# Patient Record
Sex: FEMALE | Race: WHITE | NOT HISPANIC OR LATINO | URBAN - METROPOLITAN AREA
[De-identification: names, ages, dates, MRNs, and addresses within clinical notes are randomized per-mention and may not be internally consistent; named-entity substitution may affect disease eponyms.]

---

## 2024-07-09 ENCOUNTER — OFFICE VISIT (OUTPATIENT)
Dept: URGENT CARE | Facility: CLINIC | Age: 49
End: 2024-07-09
Payer: COMMERCIAL

## 2024-07-09 VITALS
OXYGEN SATURATION: 97 % | DIASTOLIC BLOOD PRESSURE: 70 MMHG | SYSTOLIC BLOOD PRESSURE: 108 MMHG | HEART RATE: 61 BPM | TEMPERATURE: 97.6 F | RESPIRATION RATE: 18 BRPM

## 2024-07-09 DIAGNOSIS — R21 RASH: Primary | ICD-10-CM

## 2024-07-09 PROCEDURE — S9083 URGENT CARE CENTER GLOBAL: HCPCS | Performed by: EMERGENCY MEDICINE

## 2024-07-09 PROCEDURE — G0382 LEV 3 HOSP TYPE B ED VISIT: HCPCS | Performed by: EMERGENCY MEDICINE

## 2024-07-09 RX ORDER — LISINOPRIL 10 MG/1
TABLET ORAL
COMMUNITY
Start: 2024-07-04

## 2024-07-09 RX ORDER — FAMOTIDINE 20 MG/1
20 TABLET, FILM COATED ORAL
COMMUNITY
Start: 2024-05-30

## 2024-07-09 RX ORDER — METHYLPREDNISOLONE 4 MG/1
TABLET ORAL
Qty: 21 TABLET | Refills: 0 | Status: SHIPPED | OUTPATIENT
Start: 2024-07-09

## 2024-07-09 NOTE — PATIENT INSTRUCTIONS
Take the Medrol dose pack as directed  Take Benadryl 50mg at bedtime for itching/rash - Do NOT drive with this  F/u with PCP in 2-3 days  4.  Proceed to the ER if symptoms get worse  5. F/u with a dermatologist if no improvement in 2-4 weeks  6.  Avoid Augmentin or any related drugs for now

## 2024-07-09 NOTE — PROGRESS NOTES
[facility]        NAME: Kae Lewis is a 48 y.o. female  : 1975    MRN: [id number]  DATE: 2024  TIME: 4:10 PM    Assessment and Plan   Rash [R21]  1. Rash  methylPREDNISolone (Medrol) 4 MG tablet therapy pack    Probably 2nd to recent Augmentin            Patient Instructions     Patient Instructions    Take the Medrol dose pack as directed  Take Benadryl 50mg at bedtime for itching/rash - Do NOT drive with this  F/u with PCP in 2-3 days  4.  Proceed to the ER if symptoms get worse  5. F/u with a dermatologist if no improvement in 2-4 weeks  6.  Avoid Augmentin or any related drugs for now      Follow up with PCP in 3-5 days.  Proceed to  ER if symptoms worsen.    Chief Complaint     Chief Complaint   Patient presents with   • Rash     Pt c/o skin rash that is on right side of breast chest into neck that noticed this am. Its itchy. Possible bug bite bakari         History of Present Illness       47 yo female with cc rash that started under her right breast and spread to her chest and now the right side of her neck.  Patient said it is mildly itchy in nature.  Patient denies any new products but states she just recently finished a course of Augmentin.  Patient has had no prior drug allergies to penicillin.  Patient denies any trouble swallowing or shortness of breath.    Rash  Pertinent negatives include no congestion, cough, diarrhea, fatigue, fever, shortness of breath, sore throat or vomiting.       Review of Systems   Review of Systems   Constitutional:  Negative for diaphoresis, fatigue and fever.   HENT:  Negative for congestion, ear pain, nosebleeds and sore throat.    Eyes:  Negative for photophobia, pain, discharge and visual disturbance.   Respiratory:  Negative for cough, choking, chest tightness, shortness of breath and wheezing.    Cardiovascular:  Negative for chest pain and palpitations.   Gastrointestinal:  Negative for abdominal distention, abdominal pain, diarrhea and  vomiting.   Genitourinary:  Negative for dysuria, flank pain and frequency.   Musculoskeletal:  Negative for back pain, gait problem and joint swelling.   Skin:  Positive for rash. Negative for color change.   Neurological:  Negative for dizziness, syncope and headaches.   Psychiatric/Behavioral:  Negative for behavioral problems and confusion. The patient is not nervous/anxious.    All other systems reviewed and are negative.        Current Medications       Current Outpatient Medications:   •  lisinopril (ZESTRIL) 10 mg tablet, , Disp: , Rfl:   •  methylPREDNISolone (Medrol) 4 MG tablet therapy pack, Use as directed on package, Disp: 21 tablet, Rfl: 0  •  famotidine (PEPCID) 20 mg tablet, Take 20 mg by mouth daily at bedtime (Patient not taking: Reported on 7/9/2024), Disp: , Rfl:     Current Allergies     Allergies as of 07/09/2024   • (No Known Allergies)            The following portions of the patient's history were reviewed and updated as appropriate: allergies, current medications, past family history, past medical history, past social history, past surgical history and problem list.     History reviewed. No pertinent past medical history.    History reviewed. No pertinent surgical history.    History reviewed. No pertinent family history.      Medications have been verified.        Objective   /70   Pulse 61   Temp 97.6 °F (36.4 °C) (Tympanic)   Resp 18   SpO2 97%        Physical Exam     Physical Exam  Vitals and nursing note reviewed.   Constitutional:       Appearance: Normal appearance.   HENT:      Head: Normocephalic and atraumatic.      Nose: No congestion or rhinorrhea.      Mouth/Throat:      Pharynx: No posterior oropharyngeal erythema.   Eyes:      Extraocular Movements: Extraocular movements intact.      Pupils: Pupils are equal, round, and reactive to light.   Cardiovascular:      Rate and Rhythm: Normal rate.   Pulmonary:      Effort: Pulmonary effort is normal.   Musculoskeletal:       Cervical back: Normal range of motion.   Skin:     General: Skin is warm and dry.      Findings: Erythema and rash present.      Comments: Chest: There is a maculopapular erythematous rash to her anterior chest and under her right breast region as well as starting on the right side of her neck.    Back: Patient has an erythematous rash on her upper back as well.  This is also macular papular in nature and blanches to palpation.   Neurological:      General: No focal deficit present.      Mental Status: She is alert and oriented to person, place, and time.   Psychiatric:         Mood and Affect: Mood normal.